# Patient Record
Sex: MALE | NOT HISPANIC OR LATINO
[De-identification: names, ages, dates, MRNs, and addresses within clinical notes are randomized per-mention and may not be internally consistent; named-entity substitution may affect disease eponyms.]

---

## 2017-08-03 ENCOUNTER — CHARTING TRANS (OUTPATIENT)
Dept: OTHER | Age: 22
End: 2017-08-03

## 2017-08-04 ENCOUNTER — CHARTING TRANS (OUTPATIENT)
Dept: OTHER | Age: 22
End: 2017-08-04

## 2018-04-16 ENCOUNTER — TELEPHONE (OUTPATIENT)
Dept: FAMILY MEDICINE CLINIC | Facility: CLINIC | Age: 23
End: 2018-04-16

## 2018-04-16 ENCOUNTER — CHARTING TRANS (OUTPATIENT)
Dept: OTHER | Age: 23
End: 2018-04-16

## 2018-04-16 NOTE — TELEPHONE ENCOUNTER
Patient's mom advised. Verbalized understanding.  Provided phone number for [address] [facility] [phone]

## 2018-04-16 NOTE — TELEPHONE ENCOUNTER
They can try [facility]--might be called [facility] [address] [facility] now.    Tel: [phone]   Thanks

## 2018-04-16 NOTE — TELEPHONE ENCOUNTER
Call from patient's mom. Mom states patient's hands have been crusty and oozing. Has had this issues for a few years and was diagnosed with dermatitis. Patient uses betamethasone 0.05 cream that doesn't seem to help.  Patient normally sees Dr Juventino Martin out of

## 2018-04-16 NOTE — TELEPHONE ENCOUNTER
Patient is having some skin issues and would like to see a Dermatologist. Who does Dr Andrea De León recommend? Would like someone close.

## 2019-10-29 ENCOUNTER — TELEPHONE (OUTPATIENT)
Dept: DERMATOLOGY | Age: 24
End: 2019-10-29

## 2019-10-29 RX ORDER — BETAMETHASONE DIPROPIONATE 0.05 %
OINTMENT (GRAM) TOPICAL
Qty: 30 G | Refills: 1 | Status: SHIPPED | OUTPATIENT
Start: 2019-10-29

## 2019-10-29 RX ORDER — BETAMETHASONE DIPROPIONATE 0.05 %
OINTMENT (GRAM) TOPICAL
COMMUNITY
Start: 2017-08-05 | End: 2019-10-29 | Stop reason: SDUPTHER

## 2020-03-30 RX ORDER — ALBUTEROL SULFATE 2.5 MG/3ML
SOLUTION RESPIRATORY (INHALATION)
Qty: 150 ML | Refills: 0 | Status: SHIPPED | OUTPATIENT
Start: 2020-03-30

## 2020-03-30 NOTE — TELEPHONE ENCOUNTER
Medication Quantity Refills Start End   Albuterol Sulfate (VENTOLIN) (2.5 MG/3ML) 0.083% Inhalation Nebu Soln () 50 vial 0 2015     Last OV 2/28/15 for skin rash  No future appointments.       Asthma & COPD Medication Protocol Failed3/

## 2023-02-27 ENCOUNTER — PATIENT OUTREACH (OUTPATIENT)
Dept: CASE MANAGEMENT | Age: 28
End: 2023-02-27

## 2023-02-27 NOTE — PROCEDURES
The office order for PCP request is Approved and finalized on February 27, 2023.     Thanks,  [facility] [employer]

## 2024-02-07 ENCOUNTER — OFFICE VISIT (OUTPATIENT)
Dept: FAMILY MEDICINE CLINIC | Facility: CLINIC | Age: 29
End: 2024-02-07
Payer: COMMERCIAL

## 2024-02-07 VITALS
WEIGHT: 204 LBS | DIASTOLIC BLOOD PRESSURE: 88 MMHG | HEART RATE: 82 BPM | RESPIRATION RATE: 16 BRPM | OXYGEN SATURATION: 99 % | TEMPERATURE: 98 F | SYSTOLIC BLOOD PRESSURE: 138 MMHG

## 2024-02-07 DIAGNOSIS — Z00.00 WELL ADULT EXAM: Primary | ICD-10-CM

## 2024-02-07 DIAGNOSIS — G43.909 MIGRAINE WITHOUT STATUS MIGRAINOSUS, NOT INTRACTABLE, UNSPECIFIED MIGRAINE TYPE: ICD-10-CM

## 2024-02-07 DIAGNOSIS — F41.9 ANXIETY: ICD-10-CM

## 2024-02-07 DIAGNOSIS — Z72.0 CHEWING TOBACCO USE: ICD-10-CM

## 2024-02-07 LAB
ANION GAP SERPL CALC-SCNC: 4 MMOL/L (ref 0–18)
AST SERPL-CCNC: 34 U/L (ref 15–37)
BUN BLD-MCNC: 11 MG/DL (ref 9–23)
CALCIUM BLD-MCNC: 9 MG/DL (ref 8.5–10.1)
CHLORIDE SERPL-SCNC: 108 MMOL/L (ref 98–112)
CHOLEST SERPL-MCNC: 196 MG/DL (ref ?–200)
CO2 SERPL-SCNC: 30 MMOL/L (ref 21–32)
CREAT BLD-MCNC: 1.03 MG/DL
EGFRCR SERPLBLD CKD-EPI 2021: 101 ML/MIN/1.73M2 (ref 60–?)
ERYTHROCYTE [DISTWIDTH] IN BLOOD BY AUTOMATED COUNT: 11.9 %
FASTING PATIENT LIPID ANSWER: YES
FASTING STATUS PATIENT QL REPORTED: YES
GLUCOSE BLD-MCNC: 105 MG/DL (ref 70–99)
HCT VFR BLD AUTO: 45.5 %
HDLC SERPL-MCNC: 51 MG/DL (ref 40–59)
HGB BLD-MCNC: 15.8 G/DL
LDLC SERPL CALC-MCNC: 118 MG/DL (ref ?–100)
MCH RBC QN AUTO: 31.7 PG (ref 26–34)
MCHC RBC AUTO-ENTMCNC: 34.7 G/DL (ref 31–37)
MCV RBC AUTO: 91.2 FL
NONHDLC SERPL-MCNC: 145 MG/DL (ref ?–130)
OSMOLALITY SERPL CALC.SUM OF ELEC: 294 MOSM/KG (ref 275–295)
PLATELET # BLD AUTO: 157 10(3)UL (ref 150–450)
POTASSIUM SERPL-SCNC: 4 MMOL/L (ref 3.5–5.1)
RBC # BLD AUTO: 4.99 X10(6)UL
SODIUM SERPL-SCNC: 142 MMOL/L (ref 136–145)
TRIGL SERPL-MCNC: 154 MG/DL (ref 30–149)
TSI SER-ACNC: 2.07 MIU/ML (ref 0.36–3.74)
VLDLC SERPL CALC-MCNC: 27 MG/DL (ref 0–30)
WBC # BLD AUTO: 4.7 X10(3) UL (ref 4–11)

## 2024-02-07 PROCEDURE — 80061 LIPID PANEL: CPT | Performed by: FAMILY MEDICINE

## 2024-02-07 PROCEDURE — 80048 BASIC METABOLIC PNL TOTAL CA: CPT | Performed by: FAMILY MEDICINE

## 2024-02-07 PROCEDURE — 90715 TDAP VACCINE 7 YRS/> IM: CPT | Performed by: FAMILY MEDICINE

## 2024-02-07 PROCEDURE — 84450 TRANSFERASE (AST) (SGOT): CPT | Performed by: FAMILY MEDICINE

## 2024-02-07 PROCEDURE — 85027 COMPLETE CBC AUTOMATED: CPT | Performed by: FAMILY MEDICINE

## 2024-02-07 PROCEDURE — 84443 ASSAY THYROID STIM HORMONE: CPT | Performed by: FAMILY MEDICINE

## 2024-02-07 PROCEDURE — 99385 PREV VISIT NEW AGE 18-39: CPT | Performed by: FAMILY MEDICINE

## 2024-02-07 PROCEDURE — 90471 IMMUNIZATION ADMIN: CPT | Performed by: FAMILY MEDICINE

## 2024-02-07 RX ORDER — SUMATRIPTAN 50 MG/1
TABLET, FILM COATED ORAL
Qty: 12 TABLET | Refills: 3 | Status: SHIPPED | OUTPATIENT
Start: 2024-02-07

## 2024-02-07 NOTE — PROGRESS NOTES
Giorgio Dover is a 29 year old male.    CC:    Chief Complaint   Patient presents with    Physical       HPI:  Yearly PX    Last PSA: No results found for this or any previous visit (from the past [id number] hour(s)).     Last lipid:  No results found for: \"CHOLEST\", \"TRIG\", \"HDL\", \"LDL\", \"VLDL\", \"TCHDLRATIO\", \"NONHDLC\", \"CHOLHDLRATIO\", \"CALCNONHDL\"    Last Colon Cancer screening: never      Immunization History   Administered Date(s) Administered    DTAP 01/23/1995, 04/06/1995, 11/07/1995, 07/30/1996, 01/11/2000    HEP B 01/09/1995, 04/06/1995, 01/23/1996    HIB 04/06/1995, 11/07/1995, 01/23/1996, 07/30/1996    IPV 04/06/1995, 11/07/1995, 01/23/1996, 01/11/2000    MMR 07/30/1996, 01/11/2000    TDAP 02/07/2024       Patient Active Problem List   Diagnosis    Migraine: gets a few per year, had been on Imitrex in the past and would like a refill      Allergies:  No Known Allergies   Current Meds:  Current Outpatient Medications   Medication Sig Dispense Refill    SUMAtriptan (IMITREX) 50 MG Oral Tab 1 tab po at the onset of headache. May repeat 1 tab in 2 hours if needed. Do not exceed 2 tabs in 24 hours 12 tablet 3        History:  Past Medical History:   Diagnosis Date    H/O clavicle fracture     L    Migraine 12/17/08    Reactive airway disease       History reviewed. No pertinent surgical history.   History reviewed. No pertinent family history.   Family Status   Relation Status    Fa Alive    Mo Alive      Social History     Socioeconomic History    Marital status: Single   Tobacco Use    Smoking status: Never    Smokeless tobacco: Current   Substance and Sexual Activity    Alcohol use: Yes     Alcohol/week: 24.0 standard drinks of alcohol     Types: 24 Cans of beer per week        ROS:  General: energy level stable  Skin/Breast: R inner calf irritation where a boot strap rubs on the skin  GI: Denies hematochezia   (male): Denies hematuria  Psych: has Family hx of anxiety, ne notes one bout per month of anxiety  that will knock down his motivation, unknown precipitating factors for the attacks, he is still able to function, attacks last 1-2 days    Vitals: /88   Pulse 82   Temp 97.9 °F (36.6 °C)   Resp 16   Wt 204 lb (92.5 kg)   SpO2 99%      Reviewed by DAVIN Randle M.D.    Physical Exam:  GEN: well developed, well nourished, in no apparent distress  EYE: B conjunctiva and lids normal  HENT: normocephalic; normal nose, pharynx and TM's, L lower gum line with leukoplakia   NECK: No lymphadenopathy, thyromegaly or masses  CAR: S1, S2 normal, RRR; no S3, no S4; no click; murmur negative  PULM: clear to auscultation B, no accessory muscle use  GI: normal active BS+, soft, nondistended; no HSM; no masses; no bruits; no masses; nontender, no G/R/R   PSYCH: alert and oriented x 3; affect appropriate  SKIN: not examined  BREAST: not examined/not applicable  EXTREMITIES: No clubbing, cyanosis or edema  RECTAL: not examined  GENITAL: not examined  LYMPH: no supraclavicular nodes  MUSCULOSKELETAL: normal ambulation  NEURO: Awake and alert. Normal speech and articulation. No facial droop or asymmetry. Moving all extremities equally. Symmetric B patellar DTRs    ASSESSMENT AND PLAN    1. Well adult exam  Recommended decreasing etoh intake to about 1/2 of his current intake  150 minutes of aerobic exercise per week    Tdap    - AST (SGOT)  - Basic Metabolic Panel (8)  - CBC, Platelet; No Differential  - Lipid Panel  - TSH W Reflex To Free T4    2. Migraine without status migrainosus, not intractable, unspecified migraine type    - SUMAtriptan (IMITREX) 50 MG Oral Tab; 1 tab po at the onset of headache. May repeat 1 tab in 2 hours if needed. Do not exceed 2 tabs in 24 hours  Dispense: 12 tablet; Refill: 3    3. Anxiety  Offered him medical therapy and referral to counseling  He defers for now  He will watch for any precipitating factors to the attacks    4. Chewing tobacco use  Recommended cessation. If that is not feasible at  this time then he should change the location of chew placement daily.       No follow-ups on file.    Orders for this visit:    Orders Placed This Encounter   Procedures    AST (SGOT)    Basic Metabolic Panel (8)    CBC, Platelet; No Differential    Lipid Panel    TSH W Reflex To Free T4    TdaP (Adacel, Boostrix) [[id number]]    VENIPUNCTURE     Order Specific Question:   Release to patient     Answer:   Immediate       TETANUS, DIPHTHERIA TOXOIDS AND ACELLULAR PERTUSIS VACCINE (TDAP), >7 YEARS, IM USE    Meds & Refills for this Visit:  Requested Prescriptions     Signed Prescriptions Disp Refills    SUMAtriptan (IMITREX) 50 MG Oral Tab 12 tablet 3     Si tab po at the onset of headache. May repeat 1 tab in 2 hours if needed. Do not exceed 2 tabs in 24 hours             Authorized by Ronan Randle M.D.

## 2024-09-17 ENCOUNTER — TELEPHONE (OUTPATIENT)
Dept: FAMILY MEDICINE CLINIC | Facility: CLINIC | Age: 29
End: 2024-09-17

## 2024-09-17 RX ORDER — ALBUTEROL SULFATE 90 UG/1
2 INHALANT RESPIRATORY (INHALATION) EVERY 4 HOURS PRN
Qty: 1 EACH | Refills: 1 | Status: SHIPPED | OUTPATIENT
Start: 2024-09-17 | End: 2024-09-19

## 2024-09-17 NOTE — TELEPHONE ENCOUNTER
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

## 2024-09-17 NOTE — TELEPHONE ENCOUNTER
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

## 2024-09-18 ENCOUNTER — TELEPHONE (OUTPATIENT)
Dept: FAMILY MEDICINE CLINIC | Facility: CLINIC | Age: 29
End: 2024-09-18

## 2024-09-18 RX ORDER — ALBUTEROL SULFATE 90 UG/1
2 INHALANT RESPIRATORY (INHALATION) EVERY 4 HOURS PRN
Qty: 1 EACH | Refills: 1 | Status: SHIPPED | OUTPATIENT
Start: 2024-09-18

## 2024-09-19 ENCOUNTER — MED REC SCAN ONLY (OUTPATIENT)
Dept: FAMILY MEDICINE CLINIC | Facility: CLINIC | Age: 29
End: 2024-09-19